# Patient Record
Sex: FEMALE | Race: WHITE
[De-identification: names, ages, dates, MRNs, and addresses within clinical notes are randomized per-mention and may not be internally consistent; named-entity substitution may affect disease eponyms.]

---

## 2020-07-16 PROBLEM — Z00.00 ENCOUNTER FOR PREVENTIVE HEALTH EXAMINATION: Status: ACTIVE | Noted: 2020-07-16

## 2020-07-17 ENCOUNTER — APPOINTMENT (OUTPATIENT)
Dept: CARDIOLOGY | Facility: CLINIC | Age: 75
End: 2020-07-17
Payer: MEDICARE

## 2020-07-17 ENCOUNTER — NON-APPOINTMENT (OUTPATIENT)
Age: 75
End: 2020-07-17

## 2020-07-17 VITALS
HEART RATE: 88 BPM | TEMPERATURE: 98.6 F | OXYGEN SATURATION: 98 % | WEIGHT: 142 LBS | HEIGHT: 64 IN | DIASTOLIC BLOOD PRESSURE: 70 MMHG | SYSTOLIC BLOOD PRESSURE: 138 MMHG | BODY MASS INDEX: 24.24 KG/M2

## 2020-07-17 DIAGNOSIS — Z13.6 ENCOUNTER FOR SCREENING FOR CARDIOVASCULAR DISORDERS: ICD-10-CM

## 2020-07-17 DIAGNOSIS — Z82.49 FAMILY HISTORY OF ISCHEMIC HEART DISEASE AND OTHER DISEASES OF THE CIRCULATORY SYSTEM: ICD-10-CM

## 2020-07-17 DIAGNOSIS — I45.10 UNSPECIFIED RIGHT BUNDLE-BRANCH BLOCK: ICD-10-CM

## 2020-07-17 DIAGNOSIS — Z80.0 FAMILY HISTORY OF MALIGNANT NEOPLASM OF DIGESTIVE ORGANS: ICD-10-CM

## 2020-07-17 DIAGNOSIS — Z86.19 PERSONAL HISTORY OF OTHER INFECTIOUS AND PARASITIC DISEASES: ICD-10-CM

## 2020-07-17 DIAGNOSIS — Z78.9 OTHER SPECIFIED HEALTH STATUS: ICD-10-CM

## 2020-07-17 PROCEDURE — 93000 ELECTROCARDIOGRAM COMPLETE: CPT

## 2020-07-17 PROCEDURE — 99204 OFFICE O/P NEW MOD 45 MIN: CPT

## 2020-07-17 NOTE — PHYSICAL EXAM
[Normal Appearance] : normal appearance [Well Groomed] : well groomed [General Appearance - Well Developed] : well developed [No Deformities] : no deformities [General Appearance - Well Nourished] : well nourished [General Appearance - In No Acute Distress] : no acute distress [Normal Oral Mucosa] : normal oral mucosa [Eyelids - No Xanthelasma] : the eyelids demonstrated no xanthelasmas [Normal Conjunctiva] : the conjunctiva exhibited no abnormalities [Normal Jugular Venous A Waves Present] : normal jugular venous A waves present [No Oral Pallor] : no oral pallor [No Oral Cyanosis] : no oral cyanosis [Normal Jugular Venous V Waves Present] : normal jugular venous V waves present [No Jugular Venous Ivey A Waves] : no jugular venous ivey A waves [Normal] : normal [Normal Rate] : normal [No Precordial Heave] : no precordial heave was noted [Normal S1] : normal S1 [S3] : no S3 [No Gallop] : no gallop heard [Normal S2] : normal S2 [S4] : no S4 [I] : a grade 1 [Right Carotid Bruit] : no bruit heard over the right carotid [Left Carotid Bruit] : no bruit heard over the left carotid [Right Femoral Bruit] : no bruit heard over the right femoral artery [Left Femoral Bruit] : no bruit heard over the left femoral artery [2+] : right 2+ [No Abnormalities] : the abdominal aorta was not enlarged and no bruit was heard [No Pitting Edema] : no pitting edema present [Respiration, Rhythm And Depth] : normal respiratory rhythm and effort [Auscultation Breath Sounds / Voice Sounds] : lungs were clear to auscultation bilaterally [Exaggerated Use Of Accessory Muscles For Inspiration] : no accessory muscle use [Abdomen Soft] : soft [Bowel Sounds] : normal bowel sounds [Abdomen Tenderness] : non-tender [FreeTextEntry1] : Abdominal pulsation noted [Abdomen Mass (___ Cm)] : no abdominal mass palpated [Abnormal Walk] : normal gait [Cyanosis, Localized] : no localized cyanosis [Gait - Sufficient For Exercise Testing] : the gait was sufficient for exercise testing [Nail Clubbing] : no clubbing of the fingernails [Petechial Hemorrhages (___cm)] : no petechial hemorrhages [] : no rash [Skin Color & Pigmentation] : normal skin color and pigmentation [No Venous Stasis] : no venous stasis [Skin Lesions] : no skin lesions [No Skin Ulcers] : no skin ulcer [No Xanthoma] : no  xanthoma was observed [Affect] : the affect was normal [Oriented To Time, Place, And Person] : oriented to person, place, and time [No Anxiety] : not feeling anxious [Mood] : the mood was normal

## 2020-07-17 NOTE — REASON FOR VISIT
[Consultation] : a consultation regarding [Abnormal ECG] : an abnormal ECG [FreeTextEntry1] : 75-year-old female is referred to me for consultation when she was noted to have abnormal EKG on routine physical examination.\par According to her she has no chest pain.  She has stable exertional dyspnea when she rapidly climbs stairs.  She has no PND orthopnea or pedal edema.\par She has no cough fever or chills\par She has no palpitation dizziness lightheadedness near syncopal or syncopal event\par She has no claudication pain\par She has no history of abnormal EKG though she has not had EKG to this year.\par She has no recent hospital admission.\par She denies any significant sleep disturbances\par She has stable dietary restrictions\par Her weight is stable.\par She has been recently treated for herpes zoster.  At present neuralgia being treated with gabapentin.\par

## 2020-07-17 NOTE — DISCUSSION/SUMMARY
[FreeTextEntry1] : 75-year-old female with above medical history and active medical problems as noted below\par 1.  Abnormal EKG.  Dyspnea and exertional moderate level of workload.  No chest pain.  No prior EKG for comparison.  Possibility of silent infarct cannot be ruled out based on the EKG.   recommendations\par Echocardiogram to evaluate for LV ejection fraction wall motion pericardial space and assess for interatrial septum.\par Exercise treadmill stress test will be done.  If any evidence of ischemia or unable to do treadmill exercise appropriately or unable to evaluate EKG she would benefit from stress myocardial perfusion scan with or without pharmacological means.\par This will help us to evaluate and rule out any significant silent ischemic heart disease as etiology for her symptoms and abnormal EKG.\par 2.  Abdominal pulsation.  Extensive history of smoking.  Age greater than 65.  She will have abdominal aortic ultrasound to rule out any abdominal aortic aneurysm.\par 3.  Obtain labs which includes lipid panel and assess for atherosclerotic vascular disease risk to consider statin therapy if indicated.\par 4.  Follow blood pressure.  Goal less than 130/80.  According to her she is anxious.  If persistent greater than 130/80 and there is evidence of hypertensive heart disease consider pharmacotherapy.\par #5 preventive care with primary care physician\par \par Counseling regarding low saturated fat, salt and carbohydrate intake was reviewed. Active lifestyle and regular. Exercise along with weight management is advised.\par All the above were at length reviewed. Answered all the questions. Thank you very much for this kind referral. Please do not hesitate to give me a call for any question.\par Part of this transcription was done with voice recognition software and phonetically similar errors are common. I apologize for that. Please do not hesitate to call for any questions due to above.\par

## 2020-07-17 NOTE — HISTORY OF PRESENT ILLNESS
[FreeTextEntry1] : HPI\par 1.  Abnormal EKG recent diagnosis asymptomatic\par 2.  Herpes zoster neuralgia on gabapentin\par #3 former smoker.  Extensive more than 1 pack/day for almost 40 years.\par No history of hypertension, diabetes mellitus, myocardial infarction, cerebrovascular accident, peripheral arterial disease, rheumatic fever, thyroid or Lyme disease.  No history of sleep apnea.

## 2020-10-28 ENCOUNTER — APPOINTMENT (OUTPATIENT)
Dept: CARDIOLOGY | Facility: CLINIC | Age: 75
End: 2020-10-28
Payer: MEDICARE

## 2020-10-28 DIAGNOSIS — R94.31 ABNORMAL ELECTROCARDIOGRAM [ECG] [EKG]: ICD-10-CM

## 2020-10-28 PROCEDURE — 99072 ADDL SUPL MATRL&STAF TM PHE: CPT

## 2020-10-28 PROCEDURE — 93015 CV STRESS TEST SUPVJ I&R: CPT

## 2020-10-28 PROCEDURE — 93979 VASCULAR STUDY: CPT

## 2020-10-28 PROCEDURE — 93306 TTE W/DOPPLER COMPLETE: CPT

## 2020-10-29 ENCOUNTER — NON-APPOINTMENT (OUTPATIENT)
Age: 75
End: 2020-10-29

## 2020-10-29 ENCOUNTER — APPOINTMENT (OUTPATIENT)
Dept: CARDIOLOGY | Facility: CLINIC | Age: 75
End: 2020-10-29
Payer: MEDICARE

## 2020-10-29 VITALS — BODY MASS INDEX: 24.75 KG/M2 | HEIGHT: 64 IN | WEIGHT: 145 LBS

## 2020-10-29 DIAGNOSIS — I70.0 ATHEROSCLEROSIS OF AORTA: ICD-10-CM

## 2020-10-29 DIAGNOSIS — R94.30 ABNORMAL RESULT OF CARDIOVASCULAR FUNCTION STUDY, UNSPECIFIED: ICD-10-CM

## 2020-10-29 DIAGNOSIS — Z87.891 PERSONAL HISTORY OF NICOTINE DEPENDENCE: ICD-10-CM

## 2020-10-29 DIAGNOSIS — I08.0 RHEUMATIC DISORDERS OF BOTH MITRAL AND AORTIC VALVES: ICD-10-CM

## 2020-10-29 PROCEDURE — 99443: CPT

## 2020-10-29 RX ORDER — GABAPENTIN 300 MG/1
300 CAPSULE ORAL 3 TIMES DAILY
Qty: 270 | Refills: 1 | Status: DISCONTINUED | COMMUNITY
Start: 2020-07-17 | End: 2020-10-29

## 2020-10-29 NOTE — ASSESSMENT
[FreeTextEntry1] : Reviewed on October 29, 2020\par Echocardiogram October 28, 2020 as noted above\par Exercise treadmill stress test October 28, 2020 as noted above\par Abdominal aortic ultrasound October 28, 2020.  No aneurysm.  Mild atherosclerosis.

## 2020-10-29 NOTE — REASON FOR VISIT
[Abnormal ECG] : an abnormal ECG [Abnormal Test Result] : an abnormal test result [Dyspnea] : dyspnea [FreeTextEntry2] : Telephone visit [FreeTextEntry1] : Consent: Patient consented to telephone visit.  I am in [facility].  She is at home.\par Time: A total of 22 minutes was spent in review of pertinent medical records, discussion with the patient, evaluation of patient problem, and coordination of a care plan as part of this telephone visit\par \par Physical examination was not performed as a  the risk of COVID-19 cross-contamination to be greater than the benefit to the patient conferred by the physical examination.\par \par 75-year-old female was seen in 2 way audio visit today in the office.\par I have reviewed her echocardiogram, abdominal aortic ultrasound, exercise treadmill stress test with her.\par She has no new changes in her symptoms since last seen on July 17, 2020.

## 2020-10-29 NOTE — DISCUSSION/SUMMARY
[FreeTextEntry1] : 75-year-old female with above medical history active medical problems as noted below\par 1.  Abnormal exercise treadmill stress test with low level of exercise.  Shortness of breath.  Low level of heart rate achieved.  Limitation of the test discussed.  In presence of symptoms of shortness of breath reviewed possible etiologies would include deconditioning, pulmonary disease, cardiovascular disease.\par At present further evaluation considering evidence of atherosclerotic vascular disease, history of smoking in the past and low level of exercise with shortness of breath I have recommended her to have a pharmacological myocardial perfusion scan to further assess evaluate and rule out any significant obstructive coronary artery disease as etiology.\par Risk benefits alternatives were reviewed.\par Options of continued medical management or invasive coronary angiography guided therapy were also reviewed.\par At present she has opted to have a pharmacological myocardial perfusion scan after understanding risk and benefits.\par If pharmacological myocardial perfusion scan is nonischemic consider further evaluation with pulmonary function tests in presence of history of smoking in the past.\par 2.  Aortic atherosclerosis.  Reviewed risk and benefits of management of atherosclerotic vascular disease.\par Reviewed risk and benefits of aspirin 81 mg\par Reviewed risk and benefits of statin therapy if LDL cholesterol is greater than 70 in presence of evidence of atherosclerotic vascular disease for prevention of future cardiovascular events.\par At present she does not want to pursue any medical management except she probably will consider aspirin therapy.\par All the options risk and benefits were at length reviewed with her.\par 3.  Mild mitral aortic and tricuspid regurgitation with normal chamber dimension normal pulmonary artery systolic pressure.  Consider follow-up in about 2 to 3 years or for any change in her symptoms.  Reviewed with her at length.\par \par Counseling regarding low saturated fat, salt and carbohydrate intake was reviewed. Active lifestyle and regular. Exercise along with weight management is advised.\par All the above were at length reviewed. Answered all the questions. Thank you very much for this kind referral. Please do not hesitate to give me a call for any question.\par Part of this transcription was done with voice recognition software and phonetically similar errors are common. I apologize for that. Please do not hesitate to call for any questions due to above.\par

## 2020-12-18 ENCOUNTER — APPOINTMENT (OUTPATIENT)
Dept: CARDIOLOGY | Facility: CLINIC | Age: 75
End: 2020-12-18

## 2023-06-07 ENCOUNTER — NON-APPOINTMENT (OUTPATIENT)
Age: 78
End: 2023-06-07